# Patient Record
Sex: MALE | Race: WHITE | NOT HISPANIC OR LATINO
[De-identification: names, ages, dates, MRNs, and addresses within clinical notes are randomized per-mention and may not be internally consistent; named-entity substitution may affect disease eponyms.]

---

## 2022-02-01 ENCOUNTER — APPOINTMENT (OUTPATIENT)
Dept: PULMONOLOGY | Facility: CLINIC | Age: 62
End: 2022-02-01
Payer: COMMERCIAL

## 2022-02-01 VITALS
SYSTOLIC BLOOD PRESSURE: 140 MMHG | WEIGHT: 200 LBS | HEART RATE: 92 BPM | HEIGHT: 67 IN | DIASTOLIC BLOOD PRESSURE: 80 MMHG | BODY MASS INDEX: 31.39 KG/M2

## 2022-02-01 DIAGNOSIS — R53.82 CHRONIC FATIGUE, UNSPECIFIED: ICD-10-CM

## 2022-02-01 DIAGNOSIS — Z87.891 PERSONAL HISTORY OF NICOTINE DEPENDENCE: ICD-10-CM

## 2022-02-01 DIAGNOSIS — I48.91 UNSPECIFIED ATRIAL FIBRILLATION: ICD-10-CM

## 2022-02-01 DIAGNOSIS — Z72.89 OTHER PROBLEMS RELATED TO LIFESTYLE: ICD-10-CM

## 2022-02-01 PROBLEM — Z00.00 ENCOUNTER FOR PREVENTIVE HEALTH EXAMINATION: Status: ACTIVE | Noted: 2022-02-01

## 2022-02-01 PROCEDURE — 99204 OFFICE O/P NEW MOD 45 MIN: CPT

## 2022-02-01 RX ORDER — PANTOPRAZOLE SODIUM 20 MG/1
TABLET, DELAYED RELEASE ORAL
Refills: 0 | Status: ACTIVE | COMMUNITY

## 2022-02-01 RX ORDER — AMLODIPINE BESYLATE 5 MG/1
TABLET ORAL
Refills: 0 | Status: ACTIVE | COMMUNITY

## 2022-02-01 RX ORDER — DOXEPIN HYDROCHLORIDE 10 MG/ML
SOLUTION ORAL
Refills: 0 | Status: ACTIVE | COMMUNITY

## 2022-02-01 NOTE — ASSESSMENT
[FreeTextEntry1] : 61-year-old man with mild obstructive sleep apnea has chronic fatigue syndrome which is disabling and he is tired and sleepy all day to the point where he is applying for disability.\par \par Given this excessive daytime sleepiness patient will benefit with treatment of this mild obstructive sleep apnea.  Today I discussed the results in detail and next steps.  Patient agreed to go on CPAP therapy.  Will order AutoPap, patient understands with the current supply chain issues it may take a few months before he can get the machine.

## 2022-02-01 NOTE — HISTORY OF PRESENT ILLNESS
[FreeTextEntry1] : Dr. Ish Cevallos\par Dr. Kumar\par 61 year old man  with history of myalgic encephalomyelitis/chronic fatigue syndrome (going on disablity soon) initially had malaria which started his illness  is here in the sleep center to address sleep apnea.  Patient is sleepy with [place] sleepiness score of 16.  Patient does not know if there is snoring or witnessed apneas, he sleeps alone.  Patient's bedtime is around 10 -11 PM wakes up in the morning around 6-8.30 AM.  He feels tired when he wakes up, wakes up more tired than when he goes to bed.  Patient drinks 1 cup of coffee during the daytime. Patient does not have any headaches or nocturia.  He is sleepy while driving.\par \par He takes adderall rarely to help with fatigue.\par \par Due to above symptoms patient underwent a home study which showed mild obstructive sleep apnea with AHI of 12.2.\par

## 2022-02-01 NOTE — PHYSICAL EXAM
[General Appearance - Well Developed] : well developed [General Appearance - Well Nourished] : well nourished [IV] : IV [Heart Sounds] : normal S1 and S2 [Murmurs] : no murmurs [] : no respiratory distress [Auscultation Breath Sounds / Voice Sounds] : lungs were clear to auscultation bilaterally [No Focal Deficits] : no focal deficits [Oriented To Time, Place, And Person] : oriented to person, place, and time [Memory Recent] : recent memory was not impaired

## 2022-11-17 ENCOUNTER — APPOINTMENT (OUTPATIENT)
Dept: PULMONOLOGY | Facility: CLINIC | Age: 62
End: 2022-11-17

## 2022-11-17 VITALS
DIASTOLIC BLOOD PRESSURE: 90 MMHG | HEIGHT: 67 IN | WEIGHT: 225 LBS | BODY MASS INDEX: 35.31 KG/M2 | HEART RATE: 90 BPM | SYSTOLIC BLOOD PRESSURE: 140 MMHG

## 2022-11-17 DIAGNOSIS — G47.33 OBSTRUCTIVE SLEEP APNEA (ADULT) (PEDIATRIC): ICD-10-CM

## 2022-11-17 PROCEDURE — 99213 OFFICE O/P EST LOW 20 MIN: CPT

## 2022-11-17 NOTE — HISTORY OF PRESENT ILLNESS
[FreeTextEntry1] : Dr. Ish Cevallos\par Dr. Kumar\par 61 year old man  with history of myalgic encephalomyelitis/chronic fatigue syndrome (going on disablity soon) initially had malaria which started his illness  is here in the sleep center to address sleep apnea.  Patient is slight improvement sleepy with [place] sleepiness score of 14.   Patient's bedtime is around 10 -11 PM wakes up in the morning around 6-8.30 AM.  Patient drinks 1 cup of coffee during the daytime.   He is not sleepy while driving.\par \par Due to above symptoms patient underwent a home study which showed mild obstructive sleep apnea with AHI of 12.2.\par \par on resmed machine\par dme [facility]\par on fullface mask\par ahi 2\par pressure 11\par

## 2022-11-17 NOTE — ASSESSMENT
[FreeTextEntry1] : 61 year  old man  with sleep apnea is doing well with the CPAP.  Patient is compliant with the CPAP and benefited significantly with the CPAP.\par